# Patient Record
Sex: MALE | URBAN - METROPOLITAN AREA
[De-identification: names, ages, dates, MRNs, and addresses within clinical notes are randomized per-mention and may not be internally consistent; named-entity substitution may affect disease eponyms.]

---

## 2024-07-24 ENCOUNTER — OFFICE VISIT (OUTPATIENT)
Dept: URBAN - METROPOLITAN AREA CLINIC 72 | Facility: CLINIC | Age: 84
End: 2024-07-24
Payer: MEDICARE

## 2024-07-24 ENCOUNTER — DASHBOARD ENCOUNTERS (OUTPATIENT)
Age: 84
End: 2024-07-24

## 2024-07-24 VITALS
DIASTOLIC BLOOD PRESSURE: 74 MMHG | SYSTOLIC BLOOD PRESSURE: 146 MMHG | WEIGHT: 160.2 LBS | HEART RATE: 57 BPM | BODY MASS INDEX: 22.43 KG/M2 | HEIGHT: 71 IN | TEMPERATURE: 97.7 F

## 2024-07-24 DIAGNOSIS — R74.01 ELEVATED TRANSAMINASE LEVEL: ICD-10-CM

## 2024-07-24 PROCEDURE — 99204 OFFICE O/P NEW MOD 45 MIN: CPT | Performed by: INTERNAL MEDICINE

## 2024-07-24 RX ORDER — LISINOPRIL 10 MG/1
1 TABLET TABLET ORAL ONCE A DAY
Status: ACTIVE | COMMUNITY

## 2024-07-24 RX ORDER — TAMSULOSIN HYDROCHLORIDE 0.4 MG/1
TAKE ONE CAPSULE BY MOUTH ONE TIME DAILY CAPSULE ORAL
Qty: 90 UNSPECIFIED | Refills: 1 | Status: ACTIVE | COMMUNITY

## 2024-07-24 NOTE — EXAM-PHYSICAL EXAM
General--no acute distress, resting comfortably Eyes--anicteric, no pallor HENT--normocephalic, atraumatic head Neck--no lymphadenopathy, symmetric Chest--non labored breathing, equal rise Abdomen--soft, non tender, non distended, no organomegaly Ext: SOLO, no obvious sores or rashes

## 2024-07-24 NOTE — HPI-TODAY'S VISIT:
Mr. Coelho is a pleasant 83-year-old who presents as a new patient for consultation for elevated liver enzymes.  He was referred by Dr. Sydnee Finch, a copy of this consultation will be forwarded to the referring physician.    He has been seen by another local GI.  Outside records mention that He had a normal ultrasound in 2023 as well as normal CT scan in September 2023.  Lab work from PCP 7/12/2024 showed ALT 95, AST 81, alk phos 41, bilirubin 0.91, CK total was 1469 Prior LFTs ,  alk phos 60 in June 2024 4/17/2024 CK was 1791,  4/3/2024 it was 2790 acute hepatitis profile negative, DANA negative, celiac testing negative, ferritin normal 5/29/2024 LDH was 471, sed rate 11, TSHh 1.3AST 127, OET159 [id number] 15/2023 ALT 51, AST77, ALP 43  He reports that in January he had a fall.  Then had COVID since then has lost significant weight and had muscle aches and generalized weakness.  His LFTs were elevated thus the workup mentioned above.  He is here today for second opinion  He stopped his statin in april.

## 2024-07-25 ENCOUNTER — TELEPHONE ENCOUNTER (OUTPATIENT)
Dept: URBAN - METROPOLITAN AREA CLINIC 113 | Facility: CLINIC | Age: 84
End: 2024-07-25

## 2024-07-26 ENCOUNTER — TELEPHONE ENCOUNTER (OUTPATIENT)
Dept: URBAN - METROPOLITAN AREA CLINIC 72 | Facility: CLINIC | Age: 84
End: 2024-07-26

## 2024-08-27 ENCOUNTER — OFFICE VISIT (OUTPATIENT)
Dept: URBAN - METROPOLITAN AREA CLINIC 72 | Facility: CLINIC | Age: 84
End: 2024-08-27